# Patient Record
Sex: FEMALE | Race: ASIAN | NOT HISPANIC OR LATINO | Employment: UNEMPLOYED | URBAN - METROPOLITAN AREA
[De-identification: names, ages, dates, MRNs, and addresses within clinical notes are randomized per-mention and may not be internally consistent; named-entity substitution may affect disease eponyms.]

---

## 2018-04-24 ENCOUNTER — PROCEDURE VISIT (OUTPATIENT)
Dept: OBSTETRICS AND GYNECOLOGY | Facility: CLINIC | Age: 38
End: 2018-04-24

## 2018-04-24 ENCOUNTER — INITIAL PRENATAL (OUTPATIENT)
Dept: OBSTETRICS AND GYNECOLOGY | Facility: CLINIC | Age: 38
End: 2018-04-24

## 2018-04-24 VITALS — DIASTOLIC BLOOD PRESSURE: 72 MMHG | SYSTOLIC BLOOD PRESSURE: 108 MMHG | WEIGHT: 123.4 LBS

## 2018-04-24 DIAGNOSIS — E05.90 HYPERTHYROIDISM AFFECTING PREGNANCY, ANTEPARTUM: ICD-10-CM

## 2018-04-24 DIAGNOSIS — Z34.91 PREGNANCY WITH UNCERTAIN DATES IN FIRST TRIMESTER: ICD-10-CM

## 2018-04-24 DIAGNOSIS — Z34.90 EARLY STAGE OF PREGNANCY: Primary | ICD-10-CM

## 2018-04-24 DIAGNOSIS — O09.521 ELDERLY MULTIGRAVIDA IN FIRST TRIMESTER: Primary | ICD-10-CM

## 2018-04-24 DIAGNOSIS — O99.280 HYPERTHYROIDISM AFFECTING PREGNANCY, ANTEPARTUM: ICD-10-CM

## 2018-04-24 DIAGNOSIS — O09.511 ELDERLY PRIMIGRAVIDA IN FIRST TRIMESTER: ICD-10-CM

## 2018-04-24 PROBLEM — O09.519 ELDERLY PRIMIGRAVIDA, ANTEPARTUM: Status: ACTIVE | Noted: 2018-04-24

## 2018-04-24 LAB
B-HCG UR QL: POSITIVE
INTERNAL NEGATIVE CONTROL: NEGATIVE
INTERNAL POSITIVE CONTROL: POSITIVE
Lab: ABNORMAL

## 2018-04-24 PROCEDURE — 76817 TRANSVAGINAL US OBSTETRIC: CPT | Performed by: OBSTETRICS & GYNECOLOGY

## 2018-04-24 PROCEDURE — 0501F PRENATAL FLOW SHEET: CPT | Performed by: OBSTETRICS & GYNECOLOGY

## 2018-04-24 PROCEDURE — 81025 URINE PREGNANCY TEST: CPT | Performed by: OBSTETRICS & GYNECOLOGY

## 2018-04-24 RX ORDER — ONDANSETRON HYDROCHLORIDE 8 MG/1
TABLET, FILM COATED ORAL EVERY 8 HOURS PRN
COMMUNITY
End: 2018-05-15

## 2018-04-24 NOTE — PROGRESS NOTES
CC:  Initial OB visit  Patient presents for initial OB visit.  Patient recently moved in the last week from [place].  She had one visit with an OB in [place] and had a dating ultrasound that was consistent with her last menstrual period.  She did not have any lab work done.  Patient reports being on PTU prior to finding out she was pregnant.  She states she was only on 50 mg a day.  Patient stopped that when she found out she was pregnant.  Explained to patient that we will check a TSH today, but if she requires restarting the medication, she will also need referral to [facility].  Patient's medical history reviewed with her and is otherwise uncomplicated.  I did discuss with the patient and her  that there is an increased risk for chromosomal abnormalities after the age of 35.  I explained genetic screening options that were available during pregnancy.  Patient complains today of cramping.  She states that she had a lot of cramping during the flight from [place] to [place].  She denies any vaginal bleeding.  An ultrasound was done in our office today that showed a normal intrauterine pregnancy.  She was noted to have a 4.5 centimeter right ovarian cyst.  Initial OB counseling was done today.  A/P:  Supervision of pregnancy at 9 weeks with AMA and history of hyperthyroidism  --OB labs along with TSH today  --F/U in 4 weeks

## 2018-04-25 LAB
ABO GROUP BLD: (no result)
BASOPHILS # BLD AUTO: 0 X10E3/UL (ref 0–0.2)
BASOPHILS NFR BLD AUTO: 0 %
BLD GP AB SCN SERPL QL: NEGATIVE
EOSINOPHIL # BLD AUTO: 0.1 X10E3/UL (ref 0–0.4)
EOSINOPHIL NFR BLD AUTO: 2 %
ERYTHROCYTE [DISTWIDTH] IN BLOOD BY AUTOMATED COUNT: 14.4 % (ref 12.3–15.4)
HBV SURFACE AG SERPL QL IA: NEGATIVE
HCT VFR BLD AUTO: 38.1 % (ref 34–46.6)
HGB BLD-MCNC: 12.7 G/DL (ref 11.1–15.9)
HIV 1+2 AB+HIV1 P24 AG SERPL QL IA: NON REACTIVE
IMM GRANULOCYTES # BLD: 0 X10E3/UL (ref 0–0.1)
IMM GRANULOCYTES NFR BLD: 0 %
LYMPHOCYTES # BLD AUTO: 1.8 X10E3/UL (ref 0.7–3.1)
LYMPHOCYTES NFR BLD AUTO: 24 %
MCH RBC QN AUTO: 29.3 PG (ref 26.6–33)
MCHC RBC AUTO-ENTMCNC: 33.3 G/DL (ref 31.5–35.7)
MCV RBC AUTO: 88 FL (ref 79–97)
MONOCYTES # BLD AUTO: 0.6 X10E3/UL (ref 0.1–0.9)
MONOCYTES NFR BLD AUTO: 8 %
NEUTROPHILS # BLD AUTO: 5 X10E3/UL (ref 1.4–7)
NEUTROPHILS NFR BLD AUTO: 66 %
PLATELET # BLD AUTO: 315 X10E3/UL (ref 150–379)
RBC # BLD AUTO: 4.34 X10E6/UL (ref 3.77–5.28)
RH BLD: POSITIVE
RPR SER QL: NON REACTIVE
RUBV IGG SERPL IA-ACNC: 17.9 INDEX
TSH SERPL DL<=0.005 MIU/L-ACNC: 1.82 MIU/ML (ref 0.27–4.2)
WBC # BLD AUTO: 7.6 X10E3/UL (ref 3.4–10.8)

## 2018-04-26 LAB
BACTERIA UR CULT: ABNORMAL

## 2018-04-27 PROBLEM — O23.40 GBS (GROUP B STREPTOCOCCUS) UTI COMPLICATING PREGNANCY: Status: ACTIVE | Noted: 2018-04-27

## 2018-04-27 PROBLEM — B95.1 GBS (GROUP B STREPTOCOCCUS) UTI COMPLICATING PREGNANCY: Status: ACTIVE | Noted: 2018-04-27

## 2018-04-27 LAB
C TRACH RRNA CVX QL NAA+PROBE: NEGATIVE
CYTOLOGIST CVX/VAG CYTO: NORMAL
CYTOLOGY CVX/VAG DOC THIN PREP: NORMAL
DX ICD CODE: NORMAL
HIV 1 & 2 AB SER-IMP: NORMAL
HPV I/H RISK 4 DNA CVX QL PROBE+SIG AMP: NEGATIVE
N GONORRHOEA RRNA CVX QL NAA+PROBE: NEGATIVE
OTHER STN SPEC: NORMAL
PATH REPORT.FINAL DX SPEC: NORMAL
STAT OF ADQ CVX/VAG CYTO-IMP: NORMAL

## 2018-04-27 RX ORDER — AMOXICILLIN 500 MG/1
500 CAPSULE ORAL 2 TIMES DAILY
Qty: 20 CAPSULE | Refills: 0 | Status: SHIPPED | OUTPATIENT
Start: 2018-04-27 | End: 2018-05-15

## 2018-05-01 ENCOUNTER — TELEPHONE (OUTPATIENT)
Dept: OBSTETRICS AND GYNECOLOGY | Facility: CLINIC | Age: 38
End: 2018-05-01

## 2018-05-01 NOTE — TELEPHONE ENCOUNTER
----- Message from Kayce Levy MD sent at 4/27/2018  8:50 AM EDT -----  Let patient know that she has UTI and rx has been sent for her to take.

## 2018-05-03 ENCOUNTER — TELEPHONE (OUTPATIENT)
Dept: OBSTETRICS AND GYNECOLOGY | Facility: CLINIC | Age: 38
End: 2018-05-03

## 2018-05-03 NOTE — TELEPHONE ENCOUNTER
"Pts  called, asking questions about the gentics testing. Pts  stated he contacted their insurance and they are needing a \"pre approval\" for them to cover this. Pts  would like to know if this is something that can be done so his wife can have this test done. Please advise.     Pt callback: [phone]  "

## 2018-05-07 ENCOUNTER — TELEPHONE (OUTPATIENT)
Dept: OBSTETRICS AND GYNECOLOGY | Facility: CLINIC | Age: 38
End: 2018-05-07

## 2018-05-07 DIAGNOSIS — O09.511 ELDERLY PRIMIGRAVIDA IN FIRST TRIMESTER: Primary | ICD-10-CM

## 2018-05-07 NOTE — TELEPHONE ENCOUNTER
She may come in anytime and have labwork.  Order is in Epic.    Pt's  is calling regarding the genetics testing. Pt loses his insurance on May 16th, and would like to get this testing done before their appt on 5/15 to make sure that this will be covered by insurance. Is this something the pt can do? Pt would like to know if they can just come in and have it done. Please advise.     Pt callback: [phone]

## 2018-05-15 ENCOUNTER — ROUTINE PRENATAL (OUTPATIENT)
Dept: OBSTETRICS AND GYNECOLOGY | Facility: CLINIC | Age: 38
End: 2018-05-15

## 2018-05-15 VITALS — SYSTOLIC BLOOD PRESSURE: 101 MMHG | DIASTOLIC BLOOD PRESSURE: 66 MMHG | WEIGHT: 123 LBS

## 2018-05-15 DIAGNOSIS — B95.1 GROUP B STREPTOCOCCUS URINARY TRACT INFECTION AFFECTING PREGNANCY IN FIRST TRIMESTER: ICD-10-CM

## 2018-05-15 DIAGNOSIS — O23.41 GROUP B STREPTOCOCCUS URINARY TRACT INFECTION AFFECTING PREGNANCY IN FIRST TRIMESTER: ICD-10-CM

## 2018-05-15 DIAGNOSIS — R11.0 NAUSEA: ICD-10-CM

## 2018-05-15 DIAGNOSIS — O09.521 ELDERLY MULTIGRAVIDA IN FIRST TRIMESTER: Primary | ICD-10-CM

## 2018-05-15 DIAGNOSIS — Z3A.12 12 WEEKS GESTATION OF PREGNANCY: ICD-10-CM

## 2018-05-15 LAB
# FETUSES US: 1
CFDNA.FET/CFDNA.TOTAL SFR FETUS: 17.4 %
CHR X + Y ANEUP PLAS.CFDNA: NORMAL
CITATION REF LAB TEST: NORMAL
CYTOGENETICS STUDY: NORMAL
FET 13+18+21+X+Y ANEUP PLAS.CFDNA: NORMAL
FET CHR 13 TS PLAS.CFDNA QL: NORMAL
FET CHR 13 TS PLAS.CFDNA QL: NORMAL
FET CHR 18 TS PLAS.CFDNA QL: NORMAL
FET CHR 18 TS PLAS.CFDNA QL: NORMAL
FET CHR 21 TS PLAS.CFDNA QL: NORMAL
FET CHR 21 TS PLAS.CFDNA QL: NORMAL
FET CHROM X + Y ANEUP CFDNA IMP: NORMAL
GA: 11.3 WEEKS
GENETIC ALGORITHM SENSITIVITY: NORMAL %
LAB DIRECTOR NAME PROVIDER: NORMAL
Lab: NORMAL
REASON FOR REFERRAL (NARRATIVE): NORMAL
REF LAB TEST METHOD: NORMAL
SERVICE CMNT 02-IMP: NORMAL
SERVICE CMNT-IMP: NORMAL

## 2018-05-15 PROCEDURE — 0502F SUBSEQUENT PRENATAL CARE: CPT | Performed by: OBSTETRICS & GYNECOLOGY

## 2018-05-15 RX ORDER — PROMETHAZINE HYDROCHLORIDE 25 MG/1
25 TABLET ORAL EVERY 6 HOURS PRN
Qty: 30 TABLET | Refills: 1 | Status: SHIPPED | OUTPATIENT
Start: 2018-05-15 | End: 2018-06-15 | Stop reason: SDUPTHER

## 2018-05-15 RX ORDER — AMOXICILLIN 500 MG/1
500 CAPSULE ORAL 2 TIMES DAILY
Qty: 20 CAPSULE | Refills: 0 | OUTPATIENT
Start: 2018-05-15 | End: 2019-02-13

## 2018-05-15 RX ORDER — PRENATAL VIT NO.126/IRON/FOLIC 28MG-0.8MG
TABLET ORAL DAILY
COMMUNITY
End: 2018-06-01

## 2018-05-15 NOTE — PROGRESS NOTES
CC:  Pregnancy  Patient presents today complaining of continued nausea.  She denies any vomiting.  Patient is currently using Zofran which was prescribed by her OB in [place].  She would like to switch to a different medication after reading risks online.  Prescription was sent for Phenergan.  Patient was also unreachable after her last visit and has not started her antibiotic for UTI.  Explained to her that an antibiotic has been sent her pharmacy and that she should complete the course.  Also reviewed her normal TSH.  Patient had Informaseq testing done that was normal and showed a baby boy.  Reviewed those results with her today.  Discussed indications for ultrasound during pregnancy.  A/P:  Supervision of normal first pregnancy at 12 weeks  --Rx sent for amoxicillin and phenergan  --F/U in 4 weeks

## 2018-05-23 ENCOUNTER — TELEPHONE (OUTPATIENT)
Dept: OBSTETRICS AND GYNECOLOGY | Facility: CLINIC | Age: 38
End: 2018-05-23

## 2018-05-23 NOTE — TELEPHONE ENCOUNTER
I would recommend her to continue her antibiotic and finish the entire course.  Checking a urine while on an antibiotic is not accurate.  If she continues to have problems after she finishes the antibiotic, she can come in and leave another urine sample.  We will retest her urine at her next visit.  If she has any fevers, she should go to ED at [facility].    Pt's  called and said pt is still taking her antibiotic but they feel as if her bacterial infection has gotten worse. Pt c/o painful urination.     Husbands call back # [phone]

## 2018-05-29 ENCOUNTER — TELEPHONE (OUTPATIENT)
Dept: OBSTETRICS AND GYNECOLOGY | Facility: CLINIC | Age: 38
End: 2018-05-29

## 2018-05-29 NOTE — TELEPHONE ENCOUNTER
----- Message from Kayce Levy MD sent at 5/28/2018 10:56 AM EDT -----  Let patient know her urine culture is negative for infection

## 2018-06-01 ENCOUNTER — TELEPHONE (OUTPATIENT)
Dept: OBSTETRICS AND GYNECOLOGY | Facility: CLINIC | Age: 38
End: 2018-06-01

## 2018-06-01 RX ORDER — DOCONEXENT, NIACINAMIDE, .ALPHA.-TOCOPHEROL ACETATE, DL-, CHOLECALCIFEROL, .BETA.-CAROTENE, ASCORBIC ACID, THIAMINE MONONITRATE, RIBOFLAVIN, PYRIDOXINE HYDROCHLORIDE, CYANOCOBALAMIN, IRON, ZINC OXIDE, CUPRIC OXIDE, POTASSIUM IODIDE, MAGNESIUM OXIDE, FOLIC ACID, AND LEVOMEFOLATE CALCIUM 200; 15; 20; 1000; 1100; 30; 1.6; 1.8; 2.5; 12; 29; 25; 2; 150; 20; .4; .6 MG/1; MG/1; [IU]/1; [IU]/1; [IU]/1; MG/1; MG/1; MG/1; MG/1; UG/1; MG/1; MG/1; MG/1; UG/1; MG/1; MG/1; MG/1
1 CAPSULE, LIQUID FILLED ORAL DAILY
Qty: 30 CAPSULE | Refills: 6 | Status: SHIPPED | OUTPATIENT
Start: 2018-06-01

## 2018-06-01 NOTE — TELEPHONE ENCOUNTER
"I am not aware of this pill and I looked in our system to see if it could be escribed and it does not exist.  I have sent in for vitafol, which is smaller, but may be more expensive.    Pt's  is calling to see if she could be prescribed a smaller prenatal vitamin. He states they have been told about \"prenatal mini\"  "

## 2018-06-05 ENCOUNTER — TELEPHONE (OUTPATIENT)
Dept: OBSTETRICS AND GYNECOLOGY | Facility: CLINIC | Age: 38
End: 2018-06-05

## 2018-06-05 NOTE — TELEPHONE ENCOUNTER
There is not a prenatal vitamin that is combined with antinausea medication.  She has to take them separately.    Pt called, with  on the line. Pt is very confused regarding her prenatal vitamins. Pt is asking for a prenatal vitamin that will help with the nausea. I advised the pt that pre dayna was called into the pharmacy along with Phenergan to help with nausea. From my understanding it sounds like they are wanting one pill for both. Please advise.     Pt callback: [phone]

## 2018-06-15 ENCOUNTER — ROUTINE PRENATAL (OUTPATIENT)
Dept: OBSTETRICS AND GYNECOLOGY | Facility: CLINIC | Age: 38
End: 2018-06-15

## 2018-06-15 VITALS — DIASTOLIC BLOOD PRESSURE: 63 MMHG | WEIGHT: 128 LBS | SYSTOLIC BLOOD PRESSURE: 93 MMHG

## 2018-06-15 DIAGNOSIS — Z3A.16 16 WEEKS GESTATION OF PREGNANCY: ICD-10-CM

## 2018-06-15 DIAGNOSIS — R11.0 NAUSEA: ICD-10-CM

## 2018-06-15 DIAGNOSIS — O09.522 ELDERLY MULTIGRAVIDA IN SECOND TRIMESTER: Primary | ICD-10-CM

## 2018-06-15 PROCEDURE — 0502F SUBSEQUENT PRENATAL CARE: CPT | Performed by: OBSTETRICS & GYNECOLOGY

## 2018-06-15 RX ORDER — PROMETHAZINE HYDROCHLORIDE 25 MG/1
25 TABLET ORAL EVERY 6 HOURS PRN
Qty: 30 TABLET | Refills: 0 | OUTPATIENT
Start: 2018-06-15 | End: 2019-02-13

## 2018-06-15 NOTE — PROGRESS NOTES
CC:  Pregnancy  No problems or concerns. Patient continues to have nausea and feels that this is not normal.  Patient has had appropriate weight gain.  Explained to patient goals of treating nausea in pregnancy and she has not had any dehydration and is gaining weight.  She may continue with her Phenergan as needed.  Patient was offered AFP testing for neural tube defects today and she desires.  Discussed anatomy u/s in 4 weeks.  A/P:  Supervision of pregnancy at 16 weeks with AMA  --msafp today  --F/U in 4 weeks

## 2018-06-20 LAB
AFP ADJ MOM SERPL: 0.66
AFP INTERP SERPL-IMP: NORMAL
AFP INTERP SERPL-IMP: NORMAL
AFP SERPL-MCNC: 25.6 NG/ML
AGE AT DELIVERY: 36.5 YR
GA METHOD: NORMAL
GA: 16.4 WEEKS
IDDM PATIENT QL: NO
LABORATORY COMMENT REPORT: NORMAL
MULTIPLE PREGNANCY: NO
NEURAL TUBE DEFECT RISK FETUS: NORMAL %
RESULT: NORMAL

## 2018-06-22 ENCOUNTER — TELEPHONE (OUTPATIENT)
Dept: OBSTETRICS AND GYNECOLOGY | Facility: CLINIC | Age: 38
End: 2018-06-22

## 2018-06-22 NOTE — TELEPHONE ENCOUNTER
----- Message from Kayce Levy MD sent at 6/20/2018  8:00 AM EDT -----  Let patient know her neural tube defect screening test was NORMAL

## 2018-07-17 ENCOUNTER — ROUTINE PRENATAL (OUTPATIENT)
Dept: OBSTETRICS AND GYNECOLOGY | Facility: CLINIC | Age: 38
End: 2018-07-17

## 2018-07-17 ENCOUNTER — PROCEDURE VISIT (OUTPATIENT)
Dept: OBSTETRICS AND GYNECOLOGY | Facility: CLINIC | Age: 38
End: 2018-07-17

## 2018-07-17 DIAGNOSIS — N89.8 VAGINAL ITCHING: ICD-10-CM

## 2018-07-17 DIAGNOSIS — O99.280 HYPERTHYROIDISM AFFECTING PREGNANCY, ANTEPARTUM: ICD-10-CM

## 2018-07-17 DIAGNOSIS — O09.519 ELDERLY PRIMIGRAVIDA, ANTEPARTUM: Primary | ICD-10-CM

## 2018-07-17 DIAGNOSIS — Z36.3 ANTENATAL SCREENING FOR MALFORMATION USING ULTRASONICS: Primary | ICD-10-CM

## 2018-07-17 DIAGNOSIS — E05.90 HYPERTHYROIDISM AFFECTING PREGNANCY, ANTEPARTUM: ICD-10-CM

## 2018-07-17 PROCEDURE — 76805 OB US >/= 14 WKS SNGL FETUS: CPT | Performed by: OBSTETRICS & GYNECOLOGY

## 2018-07-17 PROCEDURE — 0502F SUBSEQUENT PRENATAL CARE: CPT | Performed by: OBSTETRICS & GYNECOLOGY

## 2018-07-17 RX ORDER — DOXYLAMINE SUCCINATE AND PYRIDOXINE HYDROCHLORIDE, DELAYED RELEASE TABLETS 10 MG/10 MG 10; 10 MG/1; MG/1
2 TABLET, DELAYED RELEASE ORAL NIGHTLY PRN
Qty: 60 TABLET | Refills: 1 | OUTPATIENT
Start: 2018-07-17 | End: 2019-02-13

## 2018-07-17 NOTE — PROGRESS NOTES
CC:  Pregnancy  Patient complains of vaginal itching for 5 days.  She is otherwise feeling well.  She continues to have some nausea and vomiting.  She requests rx for diclegis.  Rx sent.  Her anatomy u/s was reviewed with her.  Fetal profile was not well seen and will have patient return in 1 week for repeat ultrasound.  Will plan on rechecking her TSH today to make sure it is still normal.  Discussed 1 hr gtt at next visit and instructions given.  A/P:  Supervision of pregnancy at 21 weeks with h/o hyperthyroidism and vaginal itching  --Check TSH today  --F/U next week for repeat ultrasound to look at fetal profile  --Nuswab collected  --F/U in 4 weeks with 1 hr gtt

## 2018-07-18 LAB — TSH SERPL DL<=0.005 MIU/L-ACNC: 3.73 UIU/ML (ref 0.45–4.5)

## 2018-07-21 LAB
A VAGINAE DNA VAG QL NAA+PROBE: NORMAL SCORE
BVAB2 DNA VAG QL NAA+PROBE: NORMAL SCORE
C ALBICANS DNA VAG QL NAA+PROBE: NEGATIVE
C GLABRATA DNA VAG QL NAA+PROBE: NEGATIVE
C TRACH RRNA SPEC QL NAA+PROBE: NEGATIVE
MEGA1 DNA VAG QL NAA+PROBE: NORMAL SCORE
N GONORRHOEA RRNA SPEC QL NAA+PROBE: NEGATIVE
T VAGINALIS RRNA SPEC QL NAA+PROBE: NEGATIVE

## 2018-07-24 ENCOUNTER — TELEPHONE (OUTPATIENT)
Dept: OBSTETRICS AND GYNECOLOGY | Facility: CLINIC | Age: 38
End: 2018-07-24

## 2018-07-24 NOTE — TELEPHONE ENCOUNTER
----- Message from Kayce Levy MD sent at 7/18/2018 12:42 PM EDT -----  Let patient know her thyroid testing was normal

## 2018-07-30 ENCOUNTER — TELEPHONE (OUTPATIENT)
Dept: OBSTETRICS AND GYNECOLOGY | Facility: CLINIC | Age: 38
End: 2018-07-30

## 2018-07-30 ENCOUNTER — PROCEDURE VISIT (OUTPATIENT)
Dept: OBSTETRICS AND GYNECOLOGY | Facility: CLINIC | Age: 38
End: 2018-07-30

## 2018-07-30 DIAGNOSIS — IMO0002 EVALUATE ANATOMY NOT SEEN ON PRIOR SONOGRAM: Primary | ICD-10-CM

## 2018-07-30 PROCEDURE — 76816 OB US FOLLOW-UP PER FETUS: CPT | Performed by: OBSTETRICS & GYNECOLOGY

## 2018-07-30 RX ORDER — FAMOTIDINE 20 MG/1
20 TABLET, FILM COATED ORAL 2 TIMES DAILY PRN
Qty: 60 TABLET | Refills: 5 | OUTPATIENT
Start: 2018-07-30 | End: 2019-02-13 | Stop reason: HOSPADM

## 2018-07-30 NOTE — TELEPHONE ENCOUNTER
Shanique,     Sent Rx for pepcid    1) Treat with mylanta or tums (neutralize acid)   2) Use Pepcid to decrease acid production (should take regularly not just with heartburn).     Please let her know    Thanks   Dr. Santiago    Pt states that she has constant heartburn and wants to know if they can get medication. Please advise.

## 2018-09-05 VITALS — DIASTOLIC BLOOD PRESSURE: 66 MMHG | SYSTOLIC BLOOD PRESSURE: 99 MMHG | WEIGHT: 128.6 LBS
